# Patient Record
Sex: FEMALE | Race: WHITE | NOT HISPANIC OR LATINO | URBAN - METROPOLITAN AREA
[De-identification: names, ages, dates, MRNs, and addresses within clinical notes are randomized per-mention and may not be internally consistent; named-entity substitution may affect disease eponyms.]

---

## 2021-10-17 ENCOUNTER — EMERGENCY (EMERGENCY)
Facility: HOSPITAL | Age: 66
LOS: 1 days | Discharge: LEFT BEFORE DISCHARGE | End: 2021-10-17
Admitting: EMERGENCY MEDICINE
Payer: MEDICARE

## 2021-10-17 VITALS
HEART RATE: 84 BPM | DIASTOLIC BLOOD PRESSURE: 79 MMHG | WEIGHT: 139.99 LBS | SYSTOLIC BLOOD PRESSURE: 132 MMHG | TEMPERATURE: 98 F | OXYGEN SATURATION: 99 % | RESPIRATION RATE: 18 BRPM

## 2021-10-17 DIAGNOSIS — Z53.21 PROCEDURE AND TREATMENT NOT CARRIED OUT DUE TO PATIENT LEAVING PRIOR TO BEING SEEN BY HEALTH CARE PROVIDER: ICD-10-CM

## 2021-10-17 DIAGNOSIS — M25.532 PAIN IN LEFT WRIST: ICD-10-CM

## 2021-10-17 PROCEDURE — L9991: CPT

## 2021-10-17 NOTE — ED ADULT TRIAGE NOTE - [facility] TRG CHIEF CMPLNT
Spoke to Anastasia at Dr. Honeycutt's office, she states they do take this patient's insurance and patient was rescheduled to be seen. Requested lab results and referral faxed to [phone]. Faxed both as requested.   wrist pain/injury
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
## 2022-09-02 NOTE — ED ADULT NURSE NOTE - HISTORY OF COVID-19 VACCINATION
How Severe Are Your Spot(S)?: mild
Vaccine status unknown
What Is The Reason For Today's Visit?: Full Body Skin Examination
What Is The Reason For Today's Visit? (Being Monitored For X): concerning skin lesions on an annual basis

## 2025-06-17 NOTE — ED ADULT TRIAGE NOTE - NS ED NURSE DIRECT TO ROOM YN
"Recommendations from today's MTM visit:                                                       Increase the amlodipine to 10 mg a day to help with blood pressure.    Follow-up: Return in about 13 weeks (around 9/16/2025) for Medication Therapy Management, In-Clinic Visit at 9AM.    It was great speaking with you today.  I value your experience and would be very thankful for your time in providing feedback in our clinic survey. In the next few days, you may receive an email or text message from [employer] with a link to a survey related to your  clinical pharmacist.\"     To schedule another MTM appointment, please call the clinic directly or you may call the MTM scheduling line at [phone].    My Clinical Pharmacist's contact information:                                                      Please feel free to contact me with any questions or concerns you have.      Zoila Soares, PharmD  Medication Therapy Management Pharmacist  Voicemail: [phone]     " Yes